# Patient Record
Sex: MALE | Race: WHITE | NOT HISPANIC OR LATINO | Employment: OTHER | URBAN - METROPOLITAN AREA
[De-identification: names, ages, dates, MRNs, and addresses within clinical notes are randomized per-mention and may not be internally consistent; named-entity substitution may affect disease eponyms.]

---

## 2017-12-09 ENCOUNTER — APPOINTMENT (EMERGENCY)
Dept: RADIOLOGY | Facility: HOSPITAL | Age: 82
End: 2017-12-09
Payer: COMMERCIAL

## 2017-12-09 ENCOUNTER — APPOINTMENT (EMERGENCY)
Dept: NON INVASIVE DIAGNOSTICS | Facility: HOSPITAL | Age: 82
End: 2017-12-09
Payer: COMMERCIAL

## 2017-12-09 ENCOUNTER — HOSPITAL ENCOUNTER (EMERGENCY)
Facility: HOSPITAL | Age: 82
Discharge: HOME | End: 2017-12-09
Attending: EMERGENCY MEDICINE
Payer: COMMERCIAL

## 2017-12-09 VITALS
TEMPERATURE: 97.3 F | DIASTOLIC BLOOD PRESSURE: 65 MMHG | SYSTOLIC BLOOD PRESSURE: 136 MMHG | OXYGEN SATURATION: 99 % | WEIGHT: 132 LBS | HEART RATE: 62 BPM | RESPIRATION RATE: 16 BRPM

## 2017-12-09 DIAGNOSIS — S82.63XA AVULSION FRACTURE OF LATERAL MALLEOLUS: Primary | ICD-10-CM

## 2017-12-09 DIAGNOSIS — M79.605 LEFT LEG PAIN: ICD-10-CM

## 2017-12-09 DIAGNOSIS — R52 PAIN: ICD-10-CM

## 2017-12-09 PROCEDURE — 93971 EXTREMITY STUDY: CPT

## 2017-12-09 PROCEDURE — 73610 X-RAY EXAM OF ANKLE: CPT

## 2017-12-09 PROCEDURE — 99284 EMERGENCY DEPT VISIT MOD MDM: CPT

## 2017-12-09 RX ORDER — IBUPROFEN 400 MG/1
400 TABLET ORAL ONCE
Status: COMPLETED | OUTPATIENT
Start: 2017-12-09 | End: 2017-12-09

## 2017-12-09 RX ORDER — ACETAMINOPHEN 325 MG/1
650 TABLET ORAL ONCE
Status: COMPLETED | OUTPATIENT
Start: 2017-12-09 | End: 2017-12-09

## 2017-12-09 RX ADMIN — IBUPROFEN 400 MG: 400 TABLET, FILM COATED ORAL at 16:07

## 2017-12-09 RX ADMIN — ACETAMINOPHEN 650 MG: 325 TABLET, FILM COATED ORAL at 15:58

## 2017-12-09 NOTE — ED PROVIDER NOTES
History  Chief Complaint   Patient presents with    Leg Pain     L leg pain and swelling since last night  No history of blood clots  HPI  This is an 72-year-old male with history of prostate cancer, CAD, currently on aspirin presenting for evaluation of left leg pain  Patient states that started last night, patient was in bed when the pain started  Patient thought initially it was a muscle cramp that he gets every now and then  Patient drank some apple cider vinegar without any relief  Pain continued until this morning, he presented see the [facility] in [place], however they did not do any workup and sent him home  Patient says the pain is a little worse today than it was last night  Currently, however the pain did improve on its own  Patient did not take any medications for the pain  He is unable to describe the way the pain feels, says it is not quite a muscle cramp, however it is not sharp or dull  He denies any numbness or tingling in his left lower extremity  He says that his left ankle/foot does appear little bit swollen compared to the right side  Patient says the pain is worse when he is walking, no alleviating factors  No history of blood clots, no recent hospitalization in the past 6 months, no recent travel  Patient takes no medications currently except for daily baby aspirin  Patient denies any chest pain or shortness of breath, denies any exertional dyspnea over the past week  Patient is active, yesterday he was doing a lot of house chores  He denies any trauma or injury to his left lower extremity  Patient quit smoking in 1962, drinks on occasion, denies any drug use  None       Past Medical History:   Diagnosis Date    Cancer [facility])     Cardiac disease     Hyperlipidemia     Hypertension        History reviewed  No pertinent surgical history  History reviewed  No pertinent family history  I have reviewed and agree with the history as documented      Social History   Substance Use Topics    Smoking status: Never Smoker    Smokeless tobacco: Never Used    Alcohol use No        Review of Systems    Constitutional: Negative for appetite change, chills and fever  HENT: Negative for congestion, rhinorrhea and sore throat  Eyes: Negative for photophobia, pain and visual disturbance  Respiratory: Negative for cough, chest tightness and shortness of breath  Cardiovascular: Negative for chest pain, palpitations and leg swelling  Gastrointestinal: Negative for abdominal pain, diarrhea, nausea and vomiting  Genitourinary: Negative for dysuria, flank pain and hematuria  Musculoskeletal: Negative for back pain, neck pain and neck stiffness  Skin: Negative for color change, rash and wound  Neurological: Negative for dizziness, numbness and headaches  All other systems reviewed and are negative      Physical Exam  ED Triage Vitals [12/09/17 1334]   Temperature Pulse Respirations Blood Pressure SpO2   (!) 97 3 °F (36 3 °C) 70 16 152/67 96 %      Temp Source Heart Rate Source Patient Position - Orthostatic VS BP Location FiO2 (%)   Temporal Monitor Sitting Right arm --      Pain Score       9           Orthostatic Vital Signs  Vitals:    12/09/17 1334 12/09/17 1608   BP: 152/67 136/65   Pulse: 70 62   Patient Position - Orthostatic VS: Sitting        Physical Exam  /65   Pulse 62   Temp (!) 97 3 °F (36 3 °C) (Temporal)   Resp 16   Wt 59 9 kg (132 lb)   SpO2 99%     General Appearance:  Alert, cooperative, no distress, appears stated age   Head:  Normocephalic, without obvious abnormality, atraumatic   Eyes:  PERRL, conjunctiva/corneas clear, EOM's intact       Nose: Nares normal, septum midline, mucosa normal, no drainage or sinus tenderness   Throat: Lips, mucosa, and tongue normal; teeth and gums normal   Neck: Supple, symmetrical, trachea midline, no adenopathy   Back:   Symmetric, no curvature, ROM normal, no CVA tenderness   Lungs:   Clear to auscultation bilaterally, respirations unlabored   Chest Wall:  No tenderness or deformity   Heart:  Regular rate and rhythm, S1, S2 normal, no murmur, rub or gallop   Abdomen:   Soft, non-tender, bowel sounds active all four quadrants           Extremities: Left lower extremity without any signs of trauma, there is no swelling, no erythema, no ecchymosis  Patient with no pain on palpation of the left calf  Left calf and right calf appears symmetrical   There is no pain on palpation of the medial or lateral malleolus, however there was tenderness with palpation just proximal to the lateral malleolus  5/5 strength at the knee flexion and extension, 5/5 strength with ankle flexion extension  Pulses: 2+ and symmetric   Skin: Skin color, texture, turgor normal, no rashes or lesions       Neurologic:      Psychiatric:  Moves all extremities, sensation and strength in tact in all extremities    Normal mood and affect         ED Medications  Medications   acetaminophen (TYLENOL) tablet 650 mg (650 mg Oral Given 12/9/17 1558)   ibuprofen (MOTRIN) tablet 400 mg (400 mg Oral Given 12/9/17 1607)       Diagnostic Studies  Results Reviewed     None                 XR ankle 3+ views LEFT   Final Result by Rebecca Simon MD (12/09 1523)      1  A few tiny osseous densities adjacent to the lateral malleolus which may be related to avulsion fracture of indeterminate age  Correlate with site of pain  Workstation performed: [id number]         VAS lower limb venous duplex study, unilateral/limited    (Results Pending)         Procedures  Procedures      Phone Consults  ED Phone Contact    ED Course  ED Course as of Dec 09 1807   Sat Dec 09, 2017   1550 Patient did ambulate to the bathroom self  X-ray shows avulsion fracture lateral malleolus of unknown age  Vascular was contacted, pending duplex study  4654 Venous duplex was negative for DVT  MDM   Left lower extremity pain, likely MSK nature    We will get duplex of the left lower extremity to rule out DVT  Will get an x-ray of the ankle given the tenderness on palpation  Patient does not want anything for pain right now  CritCare Time    Disposition  Final diagnoses:   Avulsion fracture of lateral malleolus   Left leg pain     Time reflects when diagnosis was documented in both MDM as applicable and the Disposition within this note     Time User Action Codes Description Comment    12/9/2017  4:20 PM Wymartelljulian Roe Add [R52] Pain     12/9/2017  4:54 PM Cresencio Salcedo Avulsion fracture of lateral malleolus     12/9/2017  4:54 PM Mustafa Arbour Add [I70 522] 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 [address] )     12/9/2017  4:54 PM Mustafa Arbour Add [M79 605] Left leg pain       ED Disposition     ED Disposition Condition Comment    Discharge  Yovany Keith discharge to home/self care  Condition at discharge: Stable        Follow-up Information     Follow up With Specialties Details Why [facility] Call in [facility]  [facility] [id number] [phone]        There are no discharge medications for this patient  No discharge procedures on file  ED Provider  Attending physically available and evaluated Yovany Peterson 193  I managed the patient along with the ED Attending      Electronically Signed by         Mckenzie Singh MD  Resident  12/09/17 3790

## 2017-12-09 NOTE — ED ATTENDING ATTESTATION
Nilay Finley MD, saw and evaluated the patient  I have discussed the patient with the resident/non-physician practitioner and agree with the resident's/non-physician practitioner's findings, Plan of Care, and MDM as documented in the resident's/non-physician practitioner's note, except where noted  All available labs and Radiology studies were reviewed  At this point I agree with the current assessment done in the Emergency Department  I have conducted an independent evaluation of this patient a history and physical is as follows:      Critical Care Time  CritCare Time     C/o LLE pain started yest   - pt  Thought it was a muscle cramp - took apple cidar vinegar without relief  Pain is worse with walking  Pt  Went to express care today  No injury  No back pain, no numbness/tingling  No hx of dvt - no risk factors for dvt  H/o prostate cancer    Well-appearing, no distress, RRR, CTA b/l, abd  -nontender, +mild tenderness lateral fibular area just proximal to malleolus, no calf tenderness, no redness, no swelling, +n/v intact, FROM

## 2017-12-09 NOTE — DISCHARGE INSTRUCTIONS
Weightbearing as tolerated, please wear the air cast at all times while in the pain  Please follow up with Orthopedic surgery  Ankle Fracture   WHAT YOU NEED TO KNOW:   What is an ankle fracture? An ankle fracture is a break in 1 or more of the bones in your ankle  What causes an ankle fracture? · A car accident    · A direct blow to the ankle    · Falling on your ankle or twisting your ankle  What are the signs and symptoms of an ankle fracture? · Sudden, severe pain    · Bruising on your ankle    · Tenderness, redness, and swelling in your ankle    · Trouble moving or putting weight on your ankle or foot    · Weakness or numbness in your ankle    · Deformed ankle shape  How is an ankle fracture diagnosed? Your healthcare provider will ask about your injury and examine you  An x-ray, ultrasound, CT, or MRI may show a fracture, tissue damage, or other injuries  You may be given contrast liquid to help the fracture show up better in the pictures  Tell the healthcare provider if you have ever had an allergic reaction to contrast liquid  Do not enter the MRI room with anything metal  Metal can cause serious injury  Tell the healthcare provider if you have any metal in or on your body  How is an ankle fracture treated? · Support devices , such as a brace, cast, or splint, or may be needed  These help to limit your movement and protect your ankle  You may need to use crutches to protect your ankle and decrease your pain as you move around  Do not remove your device and do not put weight on your injured ankle  · Acetaminophen  decreases pain and fever  It is available without a doctor's order  Ask how much to take and how often to take it  Follow directions  Acetaminophen can cause liver damage if not taken correctly  · NSAIDs , such as ibuprofen, help decrease swelling, pain, and fever  This medicine is available with or without a doctor's order   NSAIDs can cause stomach bleeding or kidney problems in certain people  If you take blood thinner medicine, always ask your healthcare provider if NSAIDs are safe for you  Always read the medicine label and follow directions  · Prescription pain medicine  may be given  Ask your how to take this medicine safely  · Closed reduction  may be done to put your bones back into their correct position without surgery  · Open reduction surgery  is done when a closed reduction does not work or you have ligament damage  An incision is made and the bones and ligaments are put back in the correct position  This may include the use of special wires, pins, plates or screws  How can I manage my symptoms? · Rest  your ankle so that it can heal  Return to normal activities as directed  · Apply ice  on your ankle for 15 to 20 minutes every hour or as directed  Use an ice pack, or put crushed ice in a plastic bag  Cover it with a towel  Ice helps prevent tissue damage and decreases swelling and pain  · Elevate  your ankle above the level of your heart as often as you can  This will help decrease swelling and pain  Prop your ankle on pillows or blankets to keep it elevated comfortably  Call 911 for any of the following:   · You feel lightheaded, short of breath, and have chest pain  · You cough up blood  When should I seek immediate care? · Your cast feels too tight  · Your leg feels warm, tender, and painful  It may look swollen and red  · Blood soaks through your bandage  · You have severe pain in your ankle  · Your foot or toes are cold or numb  · Your foot or toenails turn blue or gray  When should I contact my healthcare provider? · Your splint feels too tight  · Your swelling has increased or returned  · You have a fever  · Your pain does not go away, even after treatment  · You have questions or concerns about your condition or care  CARE AGREEMENT:   You have the right to help plan your care   Learn about your health condition and how it may be treated  Discuss treatment options with your caregivers to decide what care you want to receive  You always have the right to refuse treatment  The above information is an  only  It is not intended as medical advice for individual conditions or treatments  Talk to your doctor, nurse or pharmacist before following any medical regimen to see if it is safe and effective for you  © 2017 [address] [employer] Information is for End User's use only and may not be sold, redistributed or otherwise used for commercial purposes  All illustrations and images included in CareNotes® are the copyrighted property of [employer]  or Patrick Christie  Leg Pain   WHAT YOU NEED TO KNOW:   Leg pain may be caused by a variety of health conditions  Your tests did not show any broken bones or blood clots  DISCHARGE INSTRUCTIONS:   Return to the emergency department if:   · You have a fever  · Your leg starts to swell  · Your leg pain gets worse  · You have numbness or tingling in your leg or toes  · You cannot put any weight on or move your leg  Contact your healthcare provider if:   · Your pain does not decrease, even after treatment  · You have questions or concerns about your condition or care  Medicines:   · NSAIDs , such as ibuprofen, help decrease swelling, pain, and fever  This medicine is available with or without a doctor's order  NSAIDs can cause stomach bleeding or kidney problems in certain people  If you take blood thinner medicine, always ask your healthcare provider if NSAIDs are safe for you  Always read the medicine label and follow directions  · Take your medicine as directed  Contact your healthcare provider if you think your medicine is not helping or if you have side effects  Tell him of her if you are allergic to any medicine  Keep a list of the medicines, vitamins, and herbs you take   Include the amounts, and when and why you take them  Bring the list or the pill bottles to follow-up visits  Carry your medicine list with you in case of an emergency  Follow up with your healthcare provider as directed: You may need more tests to find the cause of your leg pain  You may need to see an orthopedic specialist or a physical therapist  Write down your questions so you remember to ask them during your visits  Manage your leg pain:   · Rest  your injured leg so that it can heal  You may need an immobilizer, brace, or splint to limit the movement of your leg  You may need to avoid putting any weight on your leg for at least 48 hours  Return to normal activities as directed  · Ice  the injury for 20 minutes every 4 hours for up to 24 hours, or as directed  Use an ice pack, or put crushed ice in a plastic bag  Cover it with a towel to protect your skin  Ice helps prevent tissue damage and decreases swelling and pain  · Elevate  your injured leg above the level of your heart as often as you can  This will help decrease swelling and pain  If possible, prop your leg on pillows or blankets to keep the area elevated comfortably  · Use assistive devices as directed  You may need to use a cane or crutches  Assistive devices help decrease pain and pressure on your leg when you walk  Ask your healthcare provider for more information about assistive devices and how to use them correctly  · Maintain a healthy weight  Extra body weight can cause pressure and pain in your hip, knee, and ankle joints  Ask your healthcare provider how much you should weigh  Ask him to help you create a weight loss plan if you are overweight  © 2017 [address] [employer] Information is for End User's use only and may not be sold, redistributed or otherwise used for commercial purposes  All illustrations and images included in CareNotes® are the copyrighted property of [employer]  or Patrick Christie  The above information is an  only  It is not intended as medical advice for individual conditions or treatments  Talk to your doctor, nurse or pharmacist before following any medical regimen to see if it is safe and effective for you  Cast Care   WHAT YOU NEED TO KNOW:   What do I need to know about cast care? Cast care will help the cast dry and harden correctly, and then protect it until it comes off  Your cast may need up to 48 hours to dry and harden completely  Even after your cast hardens, it can be damaged  How do I care for my cast while it hardens? · Protect the cast   Do not put weight on the cast  Do not bend, lean on, or hit the cast with anything  Use the palms of your hands when you move the cast  Do not use your fingers  Your fingers may leave marks on the cast as it dries  · Change positions often  Change your position every 2 hours to help the cast dry faster  Prop your cast on something soft, such as a pillow, to prevent a flat area on your cast      · Keep the cast dry  Tie plastic trash bags around your cast to keep it dry while you bathe  You may use a blow dryer on cool or the lowest heat setting to dry your cast if it gets wet  Do not use a high heat setting, because you may burn your skin  Certain casts can get wet  Ask if you have a waterproof cast   How do I care for my cast after it hardens? · Check your cast every day  Contact your healthcare provider if you notice any cracks, dents, holes, or flaking on your cast      · Keep your cast clean and dry  Cover your cast with a towel when you eat  You may have a small piece of cast that can be removed to check on incisions under your cast  Make sure the small piece of cast is kept tightly closed  If your cast gets dirty, use a mild detergent and a damp washcloth to wipe off the outside of your cast  Continue to cover your cast with trash bags to keep it dry while you bathe  · Care for the edges of your cast   Cover the cast edges to keep them smooth   Use 4 inch pieces of waterproof tape  Place one end of the tape under the inside edge of your cast and fold it over to the outside surface  Overlap tape strips until the edges are completely covered  Change the tape as directed  Do not pull or repair any of the padding from inside the cast  This could cause blisters and sores on the skin under your cast      · Keep weight off your cast   Do not let anyone push down or lean on your cast  This may cause it to break  · Do not use sharp objects  Do not use a sharp or pointed object to scratch under your cast  This may cause wounds that can get infected, or you may lose the item inside the cast  If your skin itches, blow cool air under the cast  You may also gently scratch your skin outside the cast with a cloth  When should I seek immediate care? · Your cast breaks or gets damaged  · You see drainage, or your cast is stained or smells bad  · Your skin turns blue or pale  · Your skin tingles, burns, or is cold or numb  · You have severe pain that is getting worse and does not go away after you take pain medicine  · Your limb swells, or your cast looks or feels tighter than it was before  When should I contact my healthcare provider? · Something falls into your cast and gets stuck  · You have itching, pain, burning, or weakness where you have the cast      · You have a fever  · You have sores, blisters, or breaks on the skin around the edges of the cast     · You have questions or concerns about your condition or care  CARE AGREEMENT:   You have the right to help plan your care  Learn about your health condition and how it may be treated  Discuss treatment options with your caregivers to decide what care you want to receive  You always have the right to refuse treatment  The above information is an  only  It is not intended as medical advice for individual conditions or treatments   Talk to your doctor, nurse or pharmacist before following any medical regimen to see if it is safe and effective for you  © 2017 [address] [employer] Information is for End User's use only and may not be sold, redistributed or otherwise used for commercial purposes  All illustrations and images included in CareNotes® are the copyrighted property of [employer]  or [facility]